# Patient Record
Sex: MALE | Race: ASIAN | NOT HISPANIC OR LATINO | URBAN - METROPOLITAN AREA
[De-identification: names, ages, dates, MRNs, and addresses within clinical notes are randomized per-mention and may not be internally consistent; named-entity substitution may affect disease eponyms.]

---

## 2020-07-25 ENCOUNTER — TELEPHONE ENCOUNTER (OUTPATIENT)
Dept: URBAN - METROPOLITAN AREA CLINIC 13 | Facility: CLINIC | Age: 55
End: 2020-07-25

## 2020-07-26 ENCOUNTER — TELEPHONE ENCOUNTER (OUTPATIENT)
Dept: URBAN - METROPOLITAN AREA CLINIC 13 | Facility: CLINIC | Age: 55
End: 2020-07-26

## 2020-07-26 RX ORDER — POLYETHYLENE GLYCOL 3350, SODIUM SULFATE, SODIUM CHLORIDE, POTASSIUM CHLORIDE, ASCORBIC ACID, SODIUM ASCORBATE 7.5-2.691G
TAKE 32 OZ AS DIRECTED 5:00PM THE EVENING BEFORE AND 6HR PRIOR TO PROCEDURE KIT ORAL
Qty: 1 | Refills: 0 | Status: ACTIVE | COMMUNITY
Start: 2015-10-23

## 2020-07-26 RX ORDER — INSULIN GLARGINE 100 [IU]/ML
INJECT 50 UNITS DAILY INJECTION, SOLUTION SUBCUTANEOUS
Refills: 0 | Status: ACTIVE | COMMUNITY
Start: 2015-10-23

## 2020-07-26 RX ORDER — FENOFIBRIC ACID 135 MG/1
TAKE 1 CAPSULE DAILY CAPSULE, DELAYED RELEASE ORAL
Refills: 0 | Status: ACTIVE | COMMUNITY
Start: 2015-10-23

## 2023-10-19 ENCOUNTER — CLAIMS CREATED FROM THE CLAIM WINDOW (OUTPATIENT)
Dept: URBAN - METROPOLITAN AREA MEDICAL CENTER 19 | Facility: MEDICAL CENTER | Age: 58
End: 2023-10-19
Payer: COMMERCIAL

## 2023-10-19 DIAGNOSIS — D63.8 ANEMIA IN OTHER CHRONIC DISEASES CLASSIFIED ELSEWHERE: ICD-10-CM

## 2023-10-19 DIAGNOSIS — R04.0 EPISTAXIS: ICD-10-CM

## 2023-10-19 DIAGNOSIS — Z99.2 HEMODIALYSIS STATUS: ICD-10-CM

## 2023-10-19 DIAGNOSIS — D62 ACUTE POSTHEMORRHAGIC ANEMIA: ICD-10-CM

## 2023-10-19 DIAGNOSIS — K92.1 MELENA: ICD-10-CM

## 2023-10-19 DIAGNOSIS — N18.6 END STAGE KIDNEY DISEASE: ICD-10-CM

## 2023-10-19 PROCEDURE — 99254 IP/OBS CNSLTJ NEW/EST MOD 60: CPT | Performed by: INTERNAL MEDICINE

## 2023-10-19 PROCEDURE — 99222 1ST HOSP IP/OBS MODERATE 55: CPT | Performed by: INTERNAL MEDICINE

## 2023-10-20 ENCOUNTER — CLAIMS CREATED FROM THE CLAIM WINDOW (OUTPATIENT)
Dept: URBAN - METROPOLITAN AREA MEDICAL CENTER 19 | Facility: MEDICAL CENTER | Age: 58
End: 2023-10-20
Payer: COMMERCIAL

## 2023-10-20 DIAGNOSIS — I62.00 SUBDURAL HEMATOMA: ICD-10-CM

## 2023-10-20 DIAGNOSIS — D62 ACUTE POSTHEMORRHAGIC ANEMIA: ICD-10-CM

## 2023-10-20 DIAGNOSIS — D63.8 ANEMIA IN OTHER CHRONIC DISEASES CLASSIFIED ELSEWHERE: ICD-10-CM

## 2023-10-20 DIAGNOSIS — R04.0 EPISTAXIS: ICD-10-CM

## 2023-10-20 DIAGNOSIS — Z99.2 HEMODIALYSIS STATUS: ICD-10-CM

## 2023-10-20 DIAGNOSIS — K92.1 MELENA: ICD-10-CM

## 2023-10-20 DIAGNOSIS — D68.8 OTHER SPECIFIED COAGULATION DEFECTS: ICD-10-CM

## 2023-10-20 DIAGNOSIS — N18.6 END STAGE KIDNEY DISEASE: ICD-10-CM

## 2023-10-20 PROCEDURE — 99232 SBSQ HOSP IP/OBS MODERATE 35: CPT | Performed by: INTERNAL MEDICINE

## 2023-10-21 ENCOUNTER — CLAIMS CREATED FROM THE CLAIM WINDOW (OUTPATIENT)
Dept: URBAN - METROPOLITAN AREA MEDICAL CENTER 19 | Facility: MEDICAL CENTER | Age: 58
End: 2023-10-21
Payer: COMMERCIAL

## 2023-10-21 DIAGNOSIS — K92.0 BLOODY EMESIS: ICD-10-CM

## 2023-10-21 DIAGNOSIS — R04.0 EPISTAXIS: ICD-10-CM

## 2023-10-21 DIAGNOSIS — K92.1 MELENA: ICD-10-CM

## 2023-10-21 DIAGNOSIS — D62 ACUTE POSTHEMORRHAGIC ANEMIA: ICD-10-CM

## 2023-10-21 DIAGNOSIS — D63.8 ANEMIA IN OTHER CHRONIC DISEASES CLASSIFIED ELSEWHERE: ICD-10-CM

## 2023-10-21 DIAGNOSIS — D68.8 OTHER SPECIFIED COAGULATION DEFECTS: ICD-10-CM

## 2023-10-21 PROCEDURE — 99232 SBSQ HOSP IP/OBS MODERATE 35: CPT | Performed by: INTERNAL MEDICINE

## 2023-10-22 ENCOUNTER — CLAIMS CREATED FROM THE CLAIM WINDOW (OUTPATIENT)
Dept: URBAN - METROPOLITAN AREA MEDICAL CENTER 19 | Facility: MEDICAL CENTER | Age: 58
End: 2023-10-22
Payer: COMMERCIAL

## 2023-10-22 DIAGNOSIS — D62 ACUTE POSTHEMORRHAGIC ANEMIA: ICD-10-CM

## 2023-10-22 DIAGNOSIS — D68.9 COAGULOPATHY: ICD-10-CM

## 2023-10-22 DIAGNOSIS — D63.8 ANEMIA IN OTHER CHRONIC DISEASES CLASSIFIED ELSEWHERE: ICD-10-CM

## 2023-10-22 DIAGNOSIS — N18.6 ESRD (END STAGE RENAL DISEASE): ICD-10-CM

## 2023-10-22 DIAGNOSIS — R04.0 EPISTAXIS: ICD-10-CM

## 2023-10-22 DIAGNOSIS — K92.1 MELENA: ICD-10-CM

## 2023-10-22 PROCEDURE — 99232 SBSQ HOSP IP/OBS MODERATE 35: CPT | Performed by: INTERNAL MEDICINE

## 2023-10-27 ENCOUNTER — CLAIMS CREATED FROM THE CLAIM WINDOW (OUTPATIENT)
Dept: URBAN - METROPOLITAN AREA MEDICAL CENTER 19 | Facility: MEDICAL CENTER | Age: 58
End: 2023-10-27
Payer: COMMERCIAL

## 2023-10-27 DIAGNOSIS — R04.0 EPISTAXIS: ICD-10-CM

## 2023-10-27 DIAGNOSIS — D62 ACUTE POSTHEMORRHAGIC ANEMIA: ICD-10-CM

## 2023-10-27 DIAGNOSIS — K92.1 MELENA: ICD-10-CM

## 2023-10-27 DIAGNOSIS — D63.8 ANEMIA IN OTHER CHRONIC DISEASES CLASSIFIED ELSEWHERE: ICD-10-CM

## 2023-10-27 DIAGNOSIS — K62.6 ULCER OF ANUS AND RECTUM: ICD-10-CM

## 2023-10-27 DIAGNOSIS — K62.5 RECTAL BLEEDING: ICD-10-CM

## 2023-10-27 PROCEDURE — 99233 SBSQ HOSP IP/OBS HIGH 50: CPT | Performed by: INTERNAL MEDICINE

## 2023-10-27 PROCEDURE — 45334 SIGMOIDOSCOPY FOR BLEEDING: CPT | Performed by: INTERNAL MEDICINE

## 2023-10-28 ENCOUNTER — CLAIMS CREATED FROM THE CLAIM WINDOW (OUTPATIENT)
Dept: URBAN - METROPOLITAN AREA MEDICAL CENTER 19 | Facility: MEDICAL CENTER | Age: 58
End: 2023-10-28
Payer: COMMERCIAL

## 2023-10-28 DIAGNOSIS — D62 ACUTE POSTHEMORRHAGIC ANEMIA: ICD-10-CM

## 2023-10-28 DIAGNOSIS — K92.1 MELENA: ICD-10-CM

## 2023-10-28 DIAGNOSIS — D63.8 ANEMIA IN OTHER CHRONIC DISEASES CLASSIFIED ELSEWHERE: ICD-10-CM

## 2023-10-28 DIAGNOSIS — N18.6 ESRD (END STAGE RENAL DISEASE): ICD-10-CM

## 2023-10-28 DIAGNOSIS — R04.0 EPISTAXIS: ICD-10-CM

## 2023-10-28 DIAGNOSIS — K62.6 RECTAL ULCER: ICD-10-CM

## 2023-10-28 DIAGNOSIS — K21.9 ACID REFLUX: ICD-10-CM

## 2023-10-28 PROCEDURE — 99232 SBSQ HOSP IP/OBS MODERATE 35: CPT | Performed by: INTERNAL MEDICINE

## 2023-10-29 ENCOUNTER — CLAIMS CREATED FROM THE CLAIM WINDOW (OUTPATIENT)
Dept: URBAN - METROPOLITAN AREA MEDICAL CENTER 19 | Facility: MEDICAL CENTER | Age: 58
End: 2023-10-29
Payer: COMMERCIAL

## 2023-10-29 DIAGNOSIS — K21.9 ACID REFLUX: ICD-10-CM

## 2023-10-29 DIAGNOSIS — K92.1 MELENA: ICD-10-CM

## 2023-10-29 DIAGNOSIS — D63.8 ANEMIA IN OTHER CHRONIC DISEASES CLASSIFIED ELSEWHERE: ICD-10-CM

## 2023-10-29 DIAGNOSIS — R04.0 EPISTAXIS: ICD-10-CM

## 2023-10-29 DIAGNOSIS — D62 ACUTE POSTHEMORRHAGIC ANEMIA: ICD-10-CM

## 2023-10-29 DIAGNOSIS — K62.6 RECTAL ULCER: ICD-10-CM

## 2023-10-29 DIAGNOSIS — N18.6 ESRD (END STAGE RENAL DISEASE): ICD-10-CM

## 2023-10-29 PROCEDURE — 99232 SBSQ HOSP IP/OBS MODERATE 35: CPT | Performed by: INTERNAL MEDICINE

## 2023-10-30 ENCOUNTER — CLAIMS CREATED FROM THE CLAIM WINDOW (OUTPATIENT)
Dept: URBAN - METROPOLITAN AREA MEDICAL CENTER 19 | Facility: MEDICAL CENTER | Age: 58
End: 2023-10-30
Payer: COMMERCIAL

## 2023-10-30 DIAGNOSIS — D62 ACUTE POSTHEMORRHAGIC ANEMIA: ICD-10-CM

## 2023-10-30 DIAGNOSIS — K92.1 MELENA: ICD-10-CM

## 2023-10-30 DIAGNOSIS — D63.8 ANEMIA IN OTHER CHRONIC DISEASES CLASSIFIED ELSEWHERE: ICD-10-CM

## 2023-10-30 DIAGNOSIS — R04.0 EPISTAXIS: ICD-10-CM

## 2023-10-30 PROCEDURE — 99232 SBSQ HOSP IP/OBS MODERATE 35: CPT | Performed by: INTERNAL MEDICINE

## 2024-09-18 ENCOUNTER — DASHBOARD ENCOUNTERS (OUTPATIENT)
Age: 59
End: 2024-09-18

## 2024-09-18 ENCOUNTER — OFFICE VISIT (OUTPATIENT)
Dept: URBAN - METROPOLITAN AREA CLINIC 107 | Facility: CLINIC | Age: 59
End: 2024-09-18

## 2024-09-18 VITALS
HEART RATE: 74 BPM | BODY MASS INDEX: 26.52 KG/M2 | HEIGHT: 66 IN | OXYGEN SATURATION: 99 % | RESPIRATION RATE: 18 BRPM | WEIGHT: 165 LBS | TEMPERATURE: 97.9 F | DIASTOLIC BLOOD PRESSURE: 82 MMHG | SYSTOLIC BLOOD PRESSURE: 176 MMHG

## 2024-09-18 RX ORDER — ZOLPIDEM TARTRATE 5 MG/1
1 TABLET AT BEDTIME AS NEEDED TABLET, FILM COATED ORAL ONCE A DAY
Status: ACTIVE | COMMUNITY

## 2024-09-18 RX ORDER — ZOLPIDEM TARTRATE 10 MG/1
TAKE 1 TABLET BY MOUTH ONCE DAILY AT NIGHT AT BEDTIME AS NEEDED FOR INSOMNIA TABLET ORAL
Qty: 30 EACH | Refills: 1 | Status: ON HOLD | COMMUNITY

## 2024-09-18 RX ORDER — CARVEDILOL 12.5 MG/1
TAKE 1 TABLET BY MOUTH TWICE DAILY TABLET, FILM COATED ORAL
Qty: 180 EACH | Refills: 0 | Status: ACTIVE | COMMUNITY

## 2024-09-18 RX ORDER — CALCIUM ACETATE 667 MG
TAKE 2 TABLETS BY MOUTH THREE TIMES DAILY WITH MEALS TABLET ORAL
Qty: 540 EACH | Refills: 1 | Status: ACTIVE | COMMUNITY

## 2024-09-18 RX ORDER — ATORVASTATIN CALCIUM 80 MG/1
TABLET, FILM COATED ORAL
Qty: 90 TABLET | Status: ACTIVE | COMMUNITY

## 2024-09-18 RX ORDER — OXYCODONE HYDROCHLORIDE 5 MG/1
TAKE 1 TO 2 TABLETS BY MOUTH ONCE DAILY AS NEEDED FOR PAIN TABLET ORAL
Qty: 60 EACH | Refills: 0 | Status: ACTIVE | COMMUNITY

## 2024-09-18 RX ORDER — PANTOPRAZOLE SODIUM 40 MG/1
TAKE 1 TABLET BY MOUTH TWICE DAILY TABLET, DELAYED RELEASE ORAL
Qty: 180 EACH | Refills: 0 | Status: ACTIVE | COMMUNITY

## 2024-09-18 RX ORDER — LOSARTAN POTASSIUM 25 MG/1
TAKE 1 TABLET BY MOUTH ONCE DAILY TABLET, FILM COATED ORAL
Qty: 90 EACH | Refills: 1 | Status: ACTIVE | COMMUNITY

## 2024-09-18 RX ORDER — GENTAMICIN SULFATE 1 MG/G
CREAM TOPICAL
Qty: 30 GRAM | Status: ACTIVE | COMMUNITY

## 2024-09-18 RX ORDER — PRAZIQUANTEL 600 MG/1
TAKE 2.75 TABLETS (1650 MG TOTAL) BY MOUTH EVERY 8 HOURS FOR 3 DOSES TABLET, FILM COATED ORAL
Qty: 9 UNSPECIFIED | Refills: 0 | Status: ON HOLD | COMMUNITY

## 2024-09-18 RX ORDER — FUROSEMIDE 40 MG/1
TABLET ORAL
Qty: 180 TABLET | Status: ACTIVE | COMMUNITY

## 2024-09-18 RX ORDER — CHOLECALCIFEROL (VITAMIN D3) 50 MCG
TAKE 1 CAPSULE BY MOUTH ONCE DAILY CAPSULE ORAL
Qty: 90 EACH | Refills: 0 | Status: ACTIVE | COMMUNITY

## 2024-09-18 RX ORDER — ZOLPIDEM TARTRATE 10 MG/1
TAKE 1 TABLET BY MOUTH ONCE DAILY AT NIGHT AT BEDTIME AS NEEDED FOR INSOMNIA TABLET ORAL
Qty: 30 EACH | Refills: 1 | Status: ACTIVE | COMMUNITY

## 2024-09-18 RX ORDER — POLYETHYLENE GLYCOL 3350, SODIUM SULFATE, SODIUM CHLORIDE, POTASSIUM CHLORIDE, ASCORBIC ACID, SODIUM ASCORBATE 7.5-2.691G
TAKE 32 OZ AS DIRECTED 5:00PM THE EVENING BEFORE AND 6HR PRIOR TO PROCEDURE KIT ORAL
Qty: 1 | Refills: 0 | Status: DISCONTINUED | COMMUNITY
Start: 2015-10-23

## 2024-09-18 RX ORDER — LOSARTAN POTASSIUM 25 MG/1
TAKE 1 TABLET BY MOUTH ONCE DAILY TABLET, FILM COATED ORAL
Qty: 90 EACH | Refills: 1 | Status: ON HOLD | COMMUNITY

## 2024-09-18 RX ORDER — INSULIN GLARGINE 100 [IU]/ML
INJECT 50 UNITS DAILY INJECTION, SOLUTION SUBCUTANEOUS
Refills: 0 | Status: DISCONTINUED | COMMUNITY
Start: 2015-10-23

## 2024-09-18 RX ORDER — FENOFIBRIC ACID 135 MG/1
TAKE 1 CAPSULE DAILY CAPSULE, DELAYED RELEASE ORAL
Refills: 0 | Status: DISCONTINUED | COMMUNITY
Start: 2015-10-23

## 2024-10-03 PROBLEM — 266435005: Status: ACTIVE | Noted: 2024-10-03

## 2024-11-21 ENCOUNTER — TELEPHONE ENCOUNTER (OUTPATIENT)
Dept: URBAN - METROPOLITAN AREA CLINIC 107 | Facility: CLINIC | Age: 59
End: 2024-11-21

## 2025-01-31 ENCOUNTER — TELEPHONE ENCOUNTER (OUTPATIENT)
Dept: URBAN - METROPOLITAN AREA CLINIC 113 | Facility: CLINIC | Age: 60
End: 2025-01-31

## 2025-02-04 ENCOUNTER — OFFICE VISIT (OUTPATIENT)
Dept: URBAN - METROPOLITAN AREA MEDICAL CENTER 2 | Facility: MEDICAL CENTER | Age: 60
End: 2025-02-04
Payer: COMMERCIAL

## 2025-02-04 ENCOUNTER — TELEPHONE ENCOUNTER (OUTPATIENT)
Dept: URBAN - METROPOLITAN AREA CLINIC 113 | Facility: CLINIC | Age: 60
End: 2025-02-04

## 2025-02-04 DIAGNOSIS — R19.5 ABNORMAL CONSISTENCY OF STOOL: ICD-10-CM

## 2025-02-04 DIAGNOSIS — D12.2 ADENOMA OF ASCENDING COLON: ICD-10-CM

## 2025-02-04 DIAGNOSIS — D12.3 ADENOMA OF TRANSVERSE COLON: ICD-10-CM

## 2025-02-04 DIAGNOSIS — K20.80 ABSCESS OF ESOPHAGUS: ICD-10-CM

## 2025-02-04 DIAGNOSIS — K29.60 ADENOPAPILLOMATOSIS GASTRICA: ICD-10-CM

## 2025-02-04 DIAGNOSIS — Z12.11 COLON CANCER SCREENING: ICD-10-CM

## 2025-02-04 DIAGNOSIS — K63.89 OTHER SPECIFIED DISEASES OF INTESTINE: ICD-10-CM

## 2025-02-04 PROCEDURE — 45385 COLONOSCOPY W/LESION REMOVAL: CPT | Performed by: INTERNAL MEDICINE

## 2025-02-04 PROCEDURE — 43239 EGD BIOPSY SINGLE/MULTIPLE: CPT | Performed by: INTERNAL MEDICINE

## 2025-02-04 RX ORDER — PRAZIQUANTEL 600 MG/1
TAKE 2.75 TABLETS (1650 MG TOTAL) BY MOUTH EVERY 8 HOURS FOR 3 DOSES TABLET, FILM COATED ORAL
Qty: 9 UNSPECIFIED | Refills: 0 | Status: ON HOLD | COMMUNITY

## 2025-02-04 RX ORDER — GENTAMICIN SULFATE 1 MG/G
CREAM TOPICAL
Qty: 30 GRAM | Status: ACTIVE | COMMUNITY

## 2025-02-04 RX ORDER — ZOLPIDEM TARTRATE 10 MG/1
TAKE 1 TABLET BY MOUTH ONCE DAILY AT NIGHT AT BEDTIME AS NEEDED FOR INSOMNIA TABLET ORAL
Qty: 30 EACH | Refills: 1 | Status: ACTIVE | COMMUNITY

## 2025-02-04 RX ORDER — OXYCODONE HYDROCHLORIDE 5 MG/1
TAKE 1 TO 2 TABLETS BY MOUTH ONCE DAILY AS NEEDED FOR PAIN TABLET ORAL
Qty: 60 EACH | Refills: 0 | Status: ACTIVE | COMMUNITY

## 2025-02-04 RX ORDER — CHOLECALCIFEROL (VITAMIN D3) 50 MCG
TAKE 1 CAPSULE BY MOUTH ONCE DAILY CAPSULE ORAL
Qty: 90 EACH | Refills: 0 | Status: ACTIVE | COMMUNITY

## 2025-02-04 RX ORDER — ATORVASTATIN CALCIUM 80 MG/1
TABLET, FILM COATED ORAL
Qty: 90 TABLET | Status: ACTIVE | COMMUNITY

## 2025-02-04 RX ORDER — ZOLPIDEM TARTRATE 5 MG/1
1 TABLET AT BEDTIME AS NEEDED TABLET, FILM COATED ORAL ONCE A DAY
Status: ACTIVE | COMMUNITY

## 2025-02-04 RX ORDER — CARVEDILOL 12.5 MG/1
TAKE 1 TABLET BY MOUTH TWICE DAILY TABLET, FILM COATED ORAL
Qty: 180 EACH | Refills: 0 | Status: ACTIVE | COMMUNITY

## 2025-02-04 RX ORDER — FUROSEMIDE 40 MG/1
TABLET ORAL
Qty: 180 TABLET | Status: ACTIVE | COMMUNITY

## 2025-02-04 RX ORDER — PANTOPRAZOLE SODIUM 40 MG/1
TAKE 1 TABLET BY MOUTH TWICE DAILY TABLET, DELAYED RELEASE ORAL
Status: ACTIVE | COMMUNITY

## 2025-02-04 RX ORDER — CALCIUM ACETATE 667 MG
TAKE 2 TABLETS BY MOUTH THREE TIMES DAILY WITH MEALS TABLET ORAL
Qty: 540 EACH | Refills: 1 | Status: ACTIVE | COMMUNITY

## 2025-02-04 RX ORDER — LOSARTAN POTASSIUM 25 MG/1
TAKE 1 TABLET BY MOUTH ONCE DAILY TABLET, FILM COATED ORAL
Qty: 90 EACH | Refills: 1 | Status: ACTIVE | COMMUNITY

## 2025-02-04 RX ORDER — LOSARTAN POTASSIUM 25 MG/1
TAKE 1 TABLET BY MOUTH ONCE DAILY TABLET, FILM COATED ORAL
Qty: 90 EACH | Refills: 1 | Status: ON HOLD | COMMUNITY

## 2025-02-04 RX ORDER — ZOLPIDEM TARTRATE 10 MG/1
TAKE 1 TABLET BY MOUTH ONCE DAILY AT NIGHT AT BEDTIME AS NEEDED FOR INSOMNIA TABLET ORAL
Qty: 30 EACH | Refills: 1 | Status: ON HOLD | COMMUNITY

## 2025-02-05 ENCOUNTER — OFFICE VISIT (OUTPATIENT)
Dept: URBAN - METROPOLITAN AREA MEDICAL CENTER 2 | Facility: MEDICAL CENTER | Age: 60
End: 2025-02-05

## 2025-02-14 ENCOUNTER — OFFICE VISIT (OUTPATIENT)
Dept: URBAN - METROPOLITAN AREA CLINIC 113 | Facility: CLINIC | Age: 60
End: 2025-02-14
Payer: COMMERCIAL

## 2025-02-14 VITALS
TEMPERATURE: 98.1 F | BODY MASS INDEX: 28.77 KG/M2 | WEIGHT: 179 LBS | HEART RATE: 62 BPM | SYSTOLIC BLOOD PRESSURE: 140 MMHG | HEIGHT: 66 IN | RESPIRATION RATE: 18 BRPM | DIASTOLIC BLOOD PRESSURE: 84 MMHG

## 2025-02-14 DIAGNOSIS — Z86.0101 HISTORY OF ADENOMATOUS POLYP OF COLON: ICD-10-CM

## 2025-02-14 DIAGNOSIS — K57.90 DIVERTICULOSIS: ICD-10-CM

## 2025-02-14 DIAGNOSIS — K21.9 GASTROESOPHAGEAL REFLUX DISEASE WITHOUT ESOPHAGITIS: ICD-10-CM

## 2025-02-14 DIAGNOSIS — R19.5 POSITIVE COLORECTAL CANCER SCREENING USING COLOGUARD TEST: ICD-10-CM

## 2025-02-14 PROBLEM — 397881000: Status: ACTIVE | Noted: 2025-02-14

## 2025-02-14 PROCEDURE — 99213 OFFICE O/P EST LOW 20 MIN: CPT

## 2025-02-14 RX ORDER — FUROSEMIDE 40 MG/1
1 TABLET TABLET ORAL TWICE A DAY
Status: ACTIVE | COMMUNITY

## 2025-02-14 RX ORDER — CALCIUM ACETATE 667 MG
TAKE 2 TABLETS BY MOUTH THREE TIMES DAILY WITH MEALS TABLET ORAL
Qty: 540 EACH | Refills: 1 | Status: ACTIVE | COMMUNITY

## 2025-02-14 RX ORDER — LOSARTAN POTASSIUM 25 MG/1
TAKE 1 TABLET BY MOUTH ONCE DAILY TABLET, FILM COATED ORAL
Qty: 90 EACH | Refills: 1 | Status: ACTIVE | COMMUNITY

## 2025-02-14 RX ORDER — PANTOPRAZOLE SODIUM 40 MG/1
TAKE 1 TABLET BY MOUTH TWICE DAILY TABLET, DELAYED RELEASE ORAL
Status: ACTIVE | COMMUNITY

## 2025-02-14 RX ORDER — PANTOPRAZOLE SODIUM 40 MG/1
TAKE 1 TABLET BY MOUTH TWICE DAILY TABLET, DELAYED RELEASE ORAL
Qty: 180 | Refills: 1

## 2025-02-14 RX ORDER — CARVEDILOL 12.5 MG/1
TAKE 1 TABLET BY MOUTH TWICE DAILY TABLET, FILM COATED ORAL
Qty: 180 EACH | Refills: 0 | Status: ACTIVE | COMMUNITY

## 2025-02-14 RX ORDER — ATORVASTATIN CALCIUM 80 MG/1
TABLET, FILM COATED ORAL
Qty: 90 TABLET | Status: ACTIVE | COMMUNITY

## 2025-02-14 RX ORDER — ZOLPIDEM TARTRATE 5 MG/1
1 TABLET AT BEDTIME AS NEEDED TABLET, FILM COATED ORAL ONCE A DAY
Status: ACTIVE | COMMUNITY

## 2025-02-14 NOTE — HPI-TODAY'S VISIT:
Mr. Guallpa is a 59-year-old gentleman with past medical history significant for end-stage renal disease secondary to diabetes on peritoneal dialysis and history of intracranial hemorrhage presenting for follow-up after colonoscopy and EGD.  He was last seen in office on 9/18/2024 for evaluation regarding positive stool Cologuard test performed as part of kidney transplant valuation.  A colonoscopy was discussed, but considering his comorbidities this will need to be performed at the hospital.  His GERD symptoms well-controlled on pantoprazole 40 g daily.  No change in management at this time.  EGD will be performed evaluate for any chronic injury from GERD including Becker's esophagus.  His colonoscopy performed 2/4/2025 showed external hemorrhoids, and grade 1 internal hemorrhoids, diverticulosis in the sigmoid colon, descending colon, ascending colon, and cecum.  A 9 mm polyp in the ascending colon.  With hemostatic clip placement.  Nine 4 to 6 mm polyps in the transverse colon (7) and the cecum (2).  Pathology revealed tubular adenomas and colonic mucosa with subtle hyperplastic features.  His EGD performed that same day showed LA grade B reflux arthritis with no bleeding.  Biopsied.  Small hiatal hernia.  Nonerosive gastropathy, patchy which was biopsied to rule H. pylori.  Normal duodenum.  Pathology revealed antral and oxyntic mucosa with mild chronic inactive gastritis.  No H. pylori identified.  Partially ulcerated squamocolumnar mucosa with acute and chronic inflammation.  No intestinal metaplasia or fungal elements identified.  A colonoscopy is recommended to be repeated in 3 years for surveillance.  Today he reports he feels very well overall. He will see [place] in the next few months, for physical. He has one last portion which includes labwork for his renal transplant evaluation. His bowels are moving regularly without blood per rectum or melena. He denies abdominal pain, nausea, or vomiting. GERD symptoms overall controlled with twice daily PPI therapy.

## 2025-08-19 ENCOUNTER — ERX REFILL RESPONSE (OUTPATIENT)
Dept: URBAN - METROPOLITAN AREA CLINIC 113 | Facility: CLINIC | Age: 60
End: 2025-08-19

## 2025-08-19 RX ORDER — PANTOPRAZOLE SODIUM 40 MG/1
TAKE 1 TABLET BY MOUTH TWICE DAILY TABLET, DELAYED RELEASE ORAL
Qty: 180 TABLET | Refills: 0